# Patient Record
Sex: FEMALE | Race: OTHER | HISPANIC OR LATINO | URBAN - METROPOLITAN AREA
[De-identification: names, ages, dates, MRNs, and addresses within clinical notes are randomized per-mention and may not be internally consistent; named-entity substitution may affect disease eponyms.]

---

## 2023-06-09 ENCOUNTER — EMERGENCY (EMERGENCY)
Facility: HOSPITAL | Age: 53
LOS: 1 days | Discharge: HOME | End: 2023-06-09
Admitting: EMERGENCY MEDICINE
Payer: COMMERCIAL

## 2023-06-09 VITALS
HEART RATE: 65 BPM | RESPIRATION RATE: 20 BRPM | TEMPERATURE: 98 F | DIASTOLIC BLOOD PRESSURE: 93 MMHG | OXYGEN SATURATION: 100 % | SYSTOLIC BLOOD PRESSURE: 143 MMHG

## 2023-06-09 PROCEDURE — 99283 EMERGENCY DEPT VISIT LOW MDM: CPT

## 2023-06-09 NOTE — ED PROVIDER NOTE - PHYSICAL EXAMINATION
Vital signs reviewed.   CONSTITUTIONAL: Well-appearing; well-nourished; in no apparent distress. Non-toxic appearing.   HEAD: Normocephalic, atraumatic.  EYES: Normal conjunctiva and no sclera injection noted  ENT: normal nose; no rhinorrhea  NECK/LYMPH: Supple; non-tender; no cervical lymphadenopathy.  CARD: Normal S1, S2  RESP: Normal chest excursion with respiration; breath sounds clear and equal bilaterally  ABD/GI: soft, non-distended; non-tender  EXT/MS: moves all extremities; distal pulses are normal, no pedal edema.  SKIN: Normal for age and race; warm; dry; good turgor; no apparent lesions or exudate noted.  NEURO: Awake, alert, oriented x 3,  PSYCH: Normal mood; appropriate affect.

## 2023-06-09 NOTE — ED PROVIDER NOTE - NSFOLLOWUPCLINICS_GEN_ALL_ED_FT
[facility]  Allergy  [address] [place] [address]  Phone: [phone]  Fax:   Follow Up Time: Routine

## 2023-06-09 NOTE — ED PROVIDER NOTE - OBJECTIVE STATEMENT
51 Y/O F w/ no PMH presents to ER w/ allergic reaction admits to onset of symptoms prior to arrival. Pt was asleep and woke up with burning sensation throughout body. Associated itching took benadryl w/ relief. Previous episode few months ago attributed to seafood. No new detergents, soap, bedding, clothing, food. Denies airway involvement, chest pain, shortness of breath, abdominal pain

## 2023-06-09 NOTE — ED PROVIDER NOTE - NS ED ROS FT
Constitutional: (-) fever  Head: Normal cephalic, Atraumatic  Eyes/ENT: (-) sore throat  Cardiovascular: (-) chest pain, (-) wheezing  Respiratory: (-) cough, (-) shortness of breath  Gastrointestinal: (-) vomiting, (-) diarrhea, (-) abdominal pain  : (-) dysuria   Musculoskeletal: (-) back pain  Integumentary: (-) rash, (-) edema  Neurological: (-)loc  Allergic/Immunologic: (+) rash (+) pruritus

## 2023-06-09 NOTE — ED ADULT TRIAGE NOTE - CHIEF COMPLAINT QUOTE
Pt woke up with feeling of burning sensation  over her skin and swelling over her body with redness and itching. Pt took Benadryl 50mg at 12 MN and is starting to feel better. Has no SOB or throat or tongue swelling. No other past medical history.

## 2023-06-09 NOTE — ED PROVIDER NOTE - CLINICAL SUMMARY MEDICAL DECISION MAKING FREE TEXT BOX
DISPLAY PLAN FREE TEXT
51 Y/O F w/ no PMH presents to ER w/ allergic reaction admits to onset of symptoms prior to arrival.   No acute findings on physical exam  No airway involvement. Pt does not want medication  Will refer to allergy. Return precautions